# Patient Record
Sex: MALE | Race: WHITE | NOT HISPANIC OR LATINO | URBAN - METROPOLITAN AREA
[De-identification: names, ages, dates, MRNs, and addresses within clinical notes are randomized per-mention and may not be internally consistent; named-entity substitution may affect disease eponyms.]

---

## 2018-02-12 ENCOUNTER — EMERGENCY (EMERGENCY)
Facility: HOSPITAL | Age: 53
LOS: 1 days | Discharge: HOME | End: 2018-02-12
Attending: EMERGENCY MEDICINE
Payer: MEDICARE

## 2018-02-12 VITALS
HEIGHT: 73 IN | DIASTOLIC BLOOD PRESSURE: 90 MMHG | WEIGHT: 240.08 LBS | RESPIRATION RATE: 17 BRPM | HEART RATE: 57 BPM | SYSTOLIC BLOOD PRESSURE: 122 MMHG | TEMPERATURE: 98 F | OXYGEN SATURATION: 96 %

## 2018-02-12 DIAGNOSIS — M54.12 RADICULOPATHY, CERVICAL REGION: Chronic | ICD-10-CM

## 2018-02-12 LAB
ACETONE SERPL-MCNC: NEGATIVE — SIGNIFICANT CHANGE UP
ALBUMIN SERPL ELPH-MCNC: 3.4 G/DL — LOW (ref 3.5–5)
ALP SERPL-CCNC: 104 U/L — SIGNIFICANT CHANGE UP (ref 40–120)
ALT FLD-CCNC: 24 U/L DA — SIGNIFICANT CHANGE UP (ref 10–60)
ANION GAP SERPL CALC-SCNC: 5 MMOL/L — SIGNIFICANT CHANGE UP (ref 5–17)
AST SERPL-CCNC: 18 U/L — SIGNIFICANT CHANGE UP (ref 10–40)
BASOPHILS # BLD AUTO: 0.1 K/UL — SIGNIFICANT CHANGE UP (ref 0–0.2)
BASOPHILS NFR BLD AUTO: 1.4 % — SIGNIFICANT CHANGE UP (ref 0–2)
BILIRUB SERPL-MCNC: 0.2 MG/DL — SIGNIFICANT CHANGE UP (ref 0.2–1.2)
BUN SERPL-MCNC: 9 MG/DL — SIGNIFICANT CHANGE UP (ref 7–18)
CALCIUM SERPL-MCNC: 8.2 MG/DL — LOW (ref 8.4–10.5)
CHLORIDE SERPL-SCNC: 109 MMOL/L — HIGH (ref 96–108)
CK MB BLD-MCNC: <0.8 % — SIGNIFICANT CHANGE UP (ref 0–3.5)
CK MB CFR SERPL CALC: <1 NG/ML — SIGNIFICANT CHANGE UP (ref 0–3.6)
CK SERPL-CCNC: 126 U/L — SIGNIFICANT CHANGE UP (ref 35–232)
CO2 SERPL-SCNC: 28 MMOL/L — SIGNIFICANT CHANGE UP (ref 22–31)
CREAT SERPL-MCNC: 1.07 MG/DL — SIGNIFICANT CHANGE UP (ref 0.5–1.3)
EOSINOPHIL # BLD AUTO: 0.3 K/UL — SIGNIFICANT CHANGE UP (ref 0–0.5)
EOSINOPHIL NFR BLD AUTO: 3.8 % — SIGNIFICANT CHANGE UP (ref 0–6)
GLUCOSE SERPL-MCNC: 90 MG/DL — SIGNIFICANT CHANGE UP (ref 70–99)
HCT VFR BLD CALC: 45.6 % — SIGNIFICANT CHANGE UP (ref 39–50)
HGB BLD-MCNC: 14.8 G/DL — SIGNIFICANT CHANGE UP (ref 13–17)
LYMPHOCYTES # BLD AUTO: 3.4 K/UL — HIGH (ref 1–3.3)
LYMPHOCYTES # BLD AUTO: 41.1 % — SIGNIFICANT CHANGE UP (ref 13–44)
MAGNESIUM SERPL-MCNC: 2.2 MG/DL — SIGNIFICANT CHANGE UP (ref 1.6–2.6)
MCHC RBC-ENTMCNC: 32.2 PG — SIGNIFICANT CHANGE UP (ref 27–34)
MCHC RBC-ENTMCNC: 32.4 GM/DL — SIGNIFICANT CHANGE UP (ref 32–36)
MCV RBC AUTO: 99.5 FL — SIGNIFICANT CHANGE UP (ref 80–100)
MONOCYTES # BLD AUTO: 0.5 K/UL — SIGNIFICANT CHANGE UP (ref 0–0.9)
MONOCYTES NFR BLD AUTO: 6.2 % — SIGNIFICANT CHANGE UP (ref 2–14)
NEUTROPHILS # BLD AUTO: 3.9 K/UL — SIGNIFICANT CHANGE UP (ref 1.8–7.4)
NEUTROPHILS NFR BLD AUTO: 47.5 % — SIGNIFICANT CHANGE UP (ref 43–77)
NT-PROBNP SERPL-SCNC: 75 PG/ML — SIGNIFICANT CHANGE UP (ref 0–125)
PLATELET # BLD AUTO: 265 K/UL — SIGNIFICANT CHANGE UP (ref 150–400)
POTASSIUM SERPL-MCNC: 4.4 MMOL/L — SIGNIFICANT CHANGE UP (ref 3.5–5.3)
POTASSIUM SERPL-SCNC: 4.4 MMOL/L — SIGNIFICANT CHANGE UP (ref 3.5–5.3)
PROT SERPL-MCNC: 6.5 G/DL — SIGNIFICANT CHANGE UP (ref 6–8.3)
RBC # BLD: 4.58 M/UL — SIGNIFICANT CHANGE UP (ref 4.2–5.8)
RBC # FLD: 12.9 % — SIGNIFICANT CHANGE UP (ref 10.3–14.5)
SODIUM SERPL-SCNC: 142 MMOL/L — SIGNIFICANT CHANGE UP (ref 135–145)
TROPONIN I SERPL-MCNC: <0.015 NG/ML — SIGNIFICANT CHANGE UP (ref 0–0.04)
WBC # BLD: 8.2 K/UL — SIGNIFICANT CHANGE UP (ref 3.8–10.5)
WBC # FLD AUTO: 8.2 K/UL — SIGNIFICANT CHANGE UP (ref 3.8–10.5)

## 2018-02-12 PROCEDURE — 82550 ASSAY OF CK (CPK): CPT

## 2018-02-12 PROCEDURE — 80053 COMPREHEN METABOLIC PANEL: CPT

## 2018-02-12 PROCEDURE — 82553 CREATINE MB FRACTION: CPT

## 2018-02-12 PROCEDURE — 96374 THER/PROPH/DIAG INJ IV PUSH: CPT

## 2018-02-12 PROCEDURE — 85027 COMPLETE CBC AUTOMATED: CPT

## 2018-02-12 PROCEDURE — 83880 ASSAY OF NATRIURETIC PEPTIDE: CPT

## 2018-02-12 PROCEDURE — 70450 CT HEAD/BRAIN W/O DYE: CPT

## 2018-02-12 PROCEDURE — 99284 EMERGENCY DEPT VISIT MOD MDM: CPT

## 2018-02-12 PROCEDURE — 99284 EMERGENCY DEPT VISIT MOD MDM: CPT | Mod: 25

## 2018-02-12 PROCEDURE — 83735 ASSAY OF MAGNESIUM: CPT

## 2018-02-12 PROCEDURE — 82962 GLUCOSE BLOOD TEST: CPT

## 2018-02-12 PROCEDURE — 93005 ELECTROCARDIOGRAM TRACING: CPT

## 2018-02-12 PROCEDURE — 84484 ASSAY OF TROPONIN QUANT: CPT

## 2018-02-12 PROCEDURE — 70450 CT HEAD/BRAIN W/O DYE: CPT | Mod: 26

## 2018-02-12 PROCEDURE — 82009 KETONE BODYS QUAL: CPT

## 2018-02-12 RX ORDER — SODIUM CHLORIDE 9 MG/ML
1000 INJECTION INTRAMUSCULAR; INTRAVENOUS; SUBCUTANEOUS
Qty: 0 | Refills: 0 | Status: DISCONTINUED | OUTPATIENT
Start: 2018-02-12 | End: 2018-02-16

## 2018-02-12 RX ORDER — ONDANSETRON 8 MG/1
1 TABLET, FILM COATED ORAL
Qty: 15 | Refills: 0 | OUTPATIENT
Start: 2018-02-12 | End: 2018-02-16

## 2018-02-12 RX ORDER — MECLIZINE HCL 12.5 MG
12.5 TABLET ORAL ONCE
Qty: 0 | Refills: 0 | Status: COMPLETED | OUTPATIENT
Start: 2018-02-12 | End: 2018-02-12

## 2018-02-12 RX ORDER — MECLIZINE HCL 12.5 MG
1 TABLET ORAL
Qty: 21 | Refills: 0 | OUTPATIENT
Start: 2018-02-12 | End: 2018-02-18

## 2018-02-12 RX ORDER — METOCLOPRAMIDE HCL 10 MG
10 TABLET ORAL ONCE
Qty: 0 | Refills: 0 | Status: COMPLETED | OUTPATIENT
Start: 2018-02-12 | End: 2018-02-12

## 2018-02-12 RX ADMIN — Medication 12.5 MILLIGRAM(S): at 13:53

## 2018-02-12 RX ADMIN — SODIUM CHLORIDE 125 MILLILITER(S): 9 INJECTION INTRAMUSCULAR; INTRAVENOUS; SUBCUTANEOUS at 13:53

## 2018-02-12 RX ADMIN — Medication 104 MILLIGRAM(S): at 13:53

## 2018-02-12 NOTE — ED PROVIDER NOTE - EYES, MLM
Clear bilaterally, pupils equal, round and reactive to light. extraoccular movement intact. No ataxia.

## 2018-02-12 NOTE — ED PROVIDER NOTE - OBJECTIVE STATEMENT
53 y/o M w/ hx of HIV ( last CD4 count was  400, viral load undetectable 6 months ago) presents c/o dizziness, vertigo, ataxia w/ head movement, nausea x today. No recent eye infection, emesis, or any other complaints. NKDA.

## 2018-02-12 NOTE — ED PROVIDER NOTE - CROS ED NEURO ALL NEG
Notified Tania with lab that patient needs troponin drawn.      Wendi Palacio RN  12/12/17 [id number]     - - -

## 2018-02-12 NOTE — ED PROVIDER NOTE - SKIN, MLM
Skin normal color for race, warm, dry and intact. No evidence of rash. Multiple superficial scars present on bilateral forearms.

## 2018-11-17 ENCOUNTER — EMERGENCY (EMERGENCY)
Facility: HOSPITAL | Age: 53
LOS: 1 days | Discharge: HOME | End: 2018-11-17
Attending: EMERGENCY MEDICINE
Payer: MEDICARE

## 2018-11-17 VITALS
SYSTOLIC BLOOD PRESSURE: 124 MMHG | DIASTOLIC BLOOD PRESSURE: 81 MMHG | HEART RATE: 66 BPM | OXYGEN SATURATION: 97 % | TEMPERATURE: 98 F | RESPIRATION RATE: 18 BRPM

## 2018-11-17 VITALS — WEIGHT: 240.08 LBS | HEIGHT: 73 IN

## 2018-11-17 DIAGNOSIS — M54.12 RADICULOPATHY, CERVICAL REGION: Chronic | ICD-10-CM

## 2018-11-17 PROCEDURE — 99284 EMERGENCY DEPT VISIT MOD MDM: CPT

## 2018-11-17 PROCEDURE — 12001 RPR S/N/AX/GEN/TRNK 2.5CM/<: CPT | Mod: FA

## 2018-11-17 PROCEDURE — 99283 EMERGENCY DEPT VISIT LOW MDM: CPT | Mod: 25

## 2018-11-17 PROCEDURE — 12001 RPR S/N/AX/GEN/TRNK 2.5CM/<: CPT

## 2018-11-17 RX ORDER — IBUPROFEN 200 MG
800 TABLET ORAL ONCE
Qty: 0 | Refills: 0 | Status: COMPLETED | OUTPATIENT
Start: 2018-11-17 | End: 2018-11-17

## 2018-11-17 RX ORDER — OXYCODONE AND ACETAMINOPHEN 5; 325 MG/1; MG/1
1 TABLET ORAL ONCE
Qty: 0 | Refills: 0 | Status: DISCONTINUED | OUTPATIENT
Start: 2018-11-17 | End: 2018-11-17

## 2018-11-17 RX ORDER — IBUPROFEN 200 MG
1 TABLET ORAL
Qty: 20 | Refills: 0 | OUTPATIENT
Start: 2018-11-17 | End: 2018-11-21

## 2018-11-17 RX ADMIN — Medication 800 MILLIGRAM(S): at 20:36

## 2018-11-17 RX ADMIN — OXYCODONE AND ACETAMINOPHEN 1 TABLET(S): 5; 325 TABLET ORAL at 22:30

## 2018-11-17 RX ADMIN — Medication 800 MILLIGRAM(S): at 21:06

## 2018-11-17 RX ADMIN — OXYCODONE AND ACETAMINOPHEN 1 TABLET(S): 5; 325 TABLET ORAL at 21:59

## 2018-11-17 RX ADMIN — Medication 1 TABLET(S): at 21:59

## 2018-11-17 NOTE — ED ADULT NURSE NOTE - NSIMPLEMENTINTERV_GEN_ALL_ED
Implemented All Universal Safety Interventions:  [place] to call system. Call bell, personal items and telephone within reach. Instruct patient to call for assistance. Room bathroom lighting operational. Non-slip footwear when patient is off stretcher. Physically safe environment: no spills, clutter or unnecessary equipment. Stretcher in lowest position, wheels locked, appropriate side rails in place.

## 2018-11-17 NOTE — ED PROCEDURE NOTE - CPROC ED POST PROC CARE GUIDE1
Size Of Lesion In Cm (Optional): 0 Instructed patient/caregiver to follow-up with primary care physician./Verbal/written post procedure instructions were given to patient/caregiver./Instructed patient/caregiver regarding signs and symptoms of infection./Keep the cast/splint/dressing clean and dry. Detail Level: Zone

## 2018-11-17 NOTE — ED PROVIDER NOTE - MEDICAL DECISION MAKING DETAILS
Neurovasc intact. Tetanus immunization up to date. Plan: lac repair, will dc. Neurovasc intact. Tetanus immunization up to date. Plan: lac repair, sorin Villa MD

## 2018-11-17 NOTE — ED PROVIDER NOTE - OBJECTIVE STATEMENT
53 year-old male, history of HIV (last CD4 600s, VL undetect.), presents with cc L thumb laceration today. While cutting cilantro with knife sustained lac through nail. Denies numbness, tingling, or any other complaints. Last tetanus immunization up to date. 53 year-old male, history of HIV (last CD4 600s, VL undetect.), presents with cc L thumb laceration today. While cutting cilantro with knife sustained lac through nail. Denies numbness, tingling, or any other complaints. Last tetanus immunization up to date. -ANUSHKA Villa MD

## 2018-11-17 NOTE — ED PROVIDER NOTE - PHYSICAL EXAMINATION
L thumb 1.5 cm laceration through nail/nailbed. Mild venous bleeding. Flexion/extension intact against resistance. No sensory deficits. L thumb 1.5 cm laceration through nail/nailbed. Mild venous bleeding. Flexion/extension intact against resistance. No sensory deficits. -ANUSHKA Villa MD

## 2018-11-17 NOTE — ED STATDOCS - OBJECTIVE STATEMENT
Telemedicine assessment was conducted (using real time 2 way audio-video technology) by Dr. Casper located at [address] [place] [id number]  +++++++++++++++++++++++++++++++++++++++++++++++++++  History and Plan: 52 y/o M pt with PMHx of HIV presents to ED c/o laceration to L thumb x today. Pt states he was prepping dinner when he cut his L thumb through the nailbed. Pt is able to move his thumb. Last Tdap ~ 3 years ago. ALLERGIES: Sulfa Telemedicine assessment was conducted (using real time 2 way audio-video technology) by Dr. Casper located at [address] [place] [id number]  +++++++++++++++++++++++++++++++++++++++++++++++++++  History and Plan: 54 y/o M pt with PMHx of HIV presents to ED c/o laceration to L thumb x today. Pt states he was prepping dinner when he cut his L thumb through the nailbed. Pt is able to move his thumb. Last Tdap ~ 3 years ago. ALLERGIES: Sulfa  patient reports undetectable viral load    wound care/repair    Patient seen by me in intake for initial assessment and ordering. Physician on site to follow results and further evaluate and treat patient.

## 2019-09-06 ENCOUNTER — EMERGENCY (EMERGENCY)
Facility: HOSPITAL | Age: 54
LOS: 1 days | Discharge: HOME | End: 2019-09-06
Attending: EMERGENCY MEDICINE
Payer: MEDICARE

## 2019-09-06 VITALS
OXYGEN SATURATION: 97 % | HEIGHT: 73 IN | HEART RATE: 67 BPM | SYSTOLIC BLOOD PRESSURE: 130 MMHG | DIASTOLIC BLOOD PRESSURE: 90 MMHG | RESPIRATION RATE: 16 BRPM | WEIGHT: 250 LBS | TEMPERATURE: 98 F

## 2019-09-06 DIAGNOSIS — Z98.890 OTHER SPECIFIED POSTPROCEDURAL STATES: Chronic | ICD-10-CM

## 2019-09-06 DIAGNOSIS — M54.12 RADICULOPATHY, CERVICAL REGION: Chronic | ICD-10-CM

## 2019-09-06 PROCEDURE — 99284 EMERGENCY DEPT VISIT MOD MDM: CPT | Mod: 25

## 2019-09-06 PROCEDURE — 99284 EMERGENCY DEPT VISIT MOD MDM: CPT

## 2019-09-06 PROCEDURE — 96374 THER/PROPH/DIAG INJ IV PUSH: CPT

## 2019-09-06 RX ORDER — KETOROLAC TROMETHAMINE 30 MG/ML
15 SYRINGE (ML) INJECTION ONCE
Refills: 0 | Status: DISCONTINUED | OUTPATIENT
Start: 2019-09-06 | End: 2019-09-06

## 2019-09-06 RX ORDER — DIAZEPAM 5 MG
5 TABLET ORAL ONCE
Refills: 0 | Status: DISCONTINUED | OUTPATIENT
Start: 2019-09-06 | End: 2019-09-06

## 2019-09-06 RX ORDER — LIDOCAINE 4 G/100G
1 CREAM TOPICAL ONCE
Refills: 0 | Status: COMPLETED | OUTPATIENT
Start: 2019-09-06 | End: 2019-09-06

## 2019-09-06 RX ORDER — FAMOTIDINE 10 MG/ML
1 INJECTION INTRAVENOUS
Qty: 30 | Refills: 0
Start: 2019-09-06

## 2019-09-06 RX ORDER — LIDOCAINE 4 G/100G
1 CREAM TOPICAL
Qty: 15 | Refills: 0
Start: 2019-09-06 | End: 2019-09-20

## 2019-09-06 RX ORDER — CYCLOBENZAPRINE HYDROCHLORIDE 10 MG/1
1 TABLET, FILM COATED ORAL
Qty: 30 | Refills: 0
Start: 2019-09-06 | End: 2019-09-15

## 2019-09-06 RX ADMIN — Medication 5 MILLIGRAM(S): at 14:02

## 2019-09-06 RX ADMIN — Medication 15 MILLIGRAM(S): at 14:02

## 2019-09-06 RX ADMIN — LIDOCAINE 1 APPLICATION(S): 4 CREAM TOPICAL at 14:17

## 2019-09-06 RX ADMIN — LIDOCAINE 1 PATCH: 4 CREAM TOPICAL at 14:04

## 2019-09-06 NOTE — ED PROVIDER NOTE - CPE EDP SKIN NORM
Post-Care Instructions: I reviewed with the patient in detail post-care instructions. Patient is to wear sunprotection, and avoid picking at any of the treated lesions. Pt may apply Vaseline to crusted or scabbing areas. Consent: The patient's consent was obtained including but not limited to risks of crusting, scabbing, blistering, scarring, darker or lighter pigmentary change, recurrence, incomplete removal and infection. Number Of Freeze-Thaw Cycles: 1 freeze-thaw cycle Duration Of Freeze Thaw-Cycle (Seconds): 0 Render Post-Care Instructions In Note?: no Detail Level: Detailed normal...

## 2019-09-06 NOTE — ED ADULT TRIAGE NOTE - CHIEF COMPLAINT QUOTE
" I have a lot of pain today ", pt is here c/o pain to the neck, lower back and rectum . " I have a lot of pain today ", pt is here c/o pain to the neck, lower back and rectum . Pt denies any recent fall or injury , verbalizes having a colposcopy 08/19/19

## 2019-09-06 NOTE — ED ADULT NURSE NOTE - CHIEF COMPLAINT QUOTE
" I have a lot of pain today ", pt is here c/o pain to the neck, lower back and rectum . Pt denies any recent fall or injury , verbalizes having a colposcopy 08/19/19

## 2019-09-06 NOTE — ED PROVIDER NOTE - OBJECTIVE STATEMENT
53 y/o M with a significant PMHx of HIV, colorectal dysplasia, anal fissure and a significant PSHx of laminectomy presents to the ED with complaints of rectal pain and neck pain. Patient states rectal pain started 5 days ago and has been progressively worsening. Patient had colonoscopy on 8/19/19 and believes pain is related to that procedure because 5 samples were taken. Patient reports difficulty sitting secondary to pain. Denies blood in stool, fever, chills, pain with sex or dysuria. Patient also complaining of neck pain which he states he rarely gets since the laminectomy. Denies numbness, weakness, paresthesias or any other acute complaints.

## 2019-09-06 NOTE — ED PROVIDER NOTE - PATIENT PORTAL LINK FT
You can access the FollowMyHealth Patient Portal offered by [facility] by registering at the following website: [email or web address]. By joining [employer]’s FollowMyHealth portal, you will also be able to view your health information using other applications (apps) compatible with our system.

## 2019-09-06 NOTE — ED PROVIDER NOTE - CLINICAL SUMMARY MEDICAL DECISION MAKING FREE TEXT BOX
53 y/o M presents with neck pain and back pain. Will treat neck pain with analgesia and follow up with ortho; no signs of motor deficits. For rectum, likely anal fissure. Will treat with lidocaine and follow up with GI.

## 2019-10-26 ENCOUNTER — EMERGENCY (EMERGENCY)
Facility: HOSPITAL | Age: 54
LOS: 1 days | Discharge: HOME | End: 2019-10-26
Attending: EMERGENCY MEDICINE
Payer: MEDICARE

## 2019-10-26 VITALS
TEMPERATURE: 98 F | HEIGHT: 73 IN | RESPIRATION RATE: 17 BRPM | WEIGHT: 244.93 LBS | HEART RATE: 85 BPM | SYSTOLIC BLOOD PRESSURE: 147 MMHG | OXYGEN SATURATION: 98 % | DIASTOLIC BLOOD PRESSURE: 87 MMHG

## 2019-10-26 DIAGNOSIS — Z98.890 OTHER SPECIFIED POSTPROCEDURAL STATES: Chronic | ICD-10-CM

## 2019-10-26 DIAGNOSIS — M54.12 RADICULOPATHY, CERVICAL REGION: Chronic | ICD-10-CM

## 2019-10-26 PROCEDURE — 99283 EMERGENCY DEPT VISIT LOW MDM: CPT

## 2019-10-26 PROCEDURE — 96374 THER/PROPH/DIAG INJ IV PUSH: CPT

## 2019-10-26 PROCEDURE — 99284 EMERGENCY DEPT VISIT MOD MDM: CPT | Mod: 25

## 2019-10-26 RX ORDER — KETOROLAC TROMETHAMINE 30 MG/ML
30 SYRINGE (ML) INJECTION ONCE
Refills: 0 | Status: DISCONTINUED | OUTPATIENT
Start: 2019-10-26 | End: 2019-10-26

## 2019-10-26 RX ADMIN — Medication 30 MILLIGRAM(S): at 18:58

## 2019-10-26 NOTE — ED PROVIDER NOTE - CLINICAL SUMMARY MEDICAL DECISION MAKING FREE TEXT BOX
53 y/o M pt presents to ED with general muscle spasms. Will give NSAID for perceived muscle cramps and pain, refer back to pain management to adjust Pregabalin dosing, reassurance and discussed eating foods rich in potassium.

## 2019-10-26 NOTE — ED PROVIDER NOTE - OBJECTIVE STATEMENT
53 y/o M pt with PMHx of anal fissure and HIV, and PSHx of cervical radiculopathy and laminectomy presents to ED c/o generalized muscle twitching today. Pt states that his Pregabalin dose "increased from 25mg to 75mg" and is concerned he is going to have muscle in his heart cramp and give him a heart attack. Pt also expressed concern that Pregabalin is "interacting with Citalopram, and the doctor should know it." Pt also endorses that he is feeling too happy and has concerns he might have serotonin syndrome. Pt denies chest pain, dizziness, shortness of breath, and any other acute complaints.

## 2019-10-26 NOTE — ED PROVIDER NOTE - PROGRESS NOTE DETAILS
Ran patient through [facility], recent Rx for Percocet on 10/14. Discussed with patient half life of pre-gablin (6.5 hrs), hydration, and electrolytes via fruits and vegetables, as well as speaking with pain management for a new medication for pain control, non-opioid based. Pt is well appearing walking with steady gait, stable for discharge and follow up without fail with medical doctor. I had a detailed discussion with the patient and/or guardian regarding the historical points, exam findings, and any diagnostic results supporting the discharge diagnosis. Pt educated on care and need for follow up. Strict return instructions and red flag signs and symptoms discussed with patient. Questions answered. Pt shows understanding of discharge information and agrees to follow.

## 2019-10-26 NOTE — ED PROVIDER NOTE - ATTENDING CONTRIBUTION TO CARE
I completed an independent physical examination.   I have signed out the follow up of any pending tests (i.e. labs, radiological studies) to the PA/NP.  I have discussed the patient’s plan of care and disposition with the PA/NP    Patient with muscle twitching. Will provide analgesia and instructions to follow up with pain management specialist.

## 2019-10-26 NOTE — ED PROVIDER NOTE - PATIENT PORTAL LINK FT
You can access the FollowMyHealth Patient Portal offered by [facility] by registering at the following website: [email or web address]. By joining [employer]’s FollowMyHealth portal, you will also be able to view your health information using other applications (apps) compatible with our system.

## 2019-10-26 NOTE — ED PROVIDER NOTE - PHYSICAL EXAMINATION
MSK: Not ataxic, muscle movements are fluid, no decreased muscle strength, moving all extremities uniformly, good eye contact, speaking in full sentences.

## 2019-10-26 NOTE — ED ADULT NURSE NOTE - OBJECTIVE STATEMENT
States he has generalized body cramping and muscle twitching , worse to right lower back and right calf x3 days. States his doctor increased his medication pregabalin from 25mg to 75mg .

## 2019-10-27 PROBLEM — K60.2 ANAL FISSURE, UNSPECIFIED: Chronic | Status: ACTIVE | Noted: 2019-09-06

## 2020-05-24 NOTE — ED ADULT TRIAGE NOTE - SPO2 (%)
96 75 y/o M returns to ED after being called back for a foot fracture on the left foot.  xray concerning for lisfranc or charcot foot--pt has neuropathy from cancer treatment  no acute trauma except puncture wound  plan splint, crutches, podiatry fu

## 2020-08-10 NOTE — ED ADULT NURSE NOTE - NSSUHOSCREENINGYN_ED_ALL_ED
Addended by: LB WHEAT on: 8/10/2020 01:53 PM     Modules accepted: Orders     Yes - the patient is able to be screened

## 2021-02-19 NOTE — ED PROVIDER NOTE - SKIN [+], MLM
Called patient - LMOM to call the office back to let us know that he pulled out his stent  Office number is provided  Patient is also placed in call backs for 6 months  He needs a ultraosund PTV  LACERATION

## 2021-11-10 NOTE — ED PROVIDER NOTE - PROGRESS NOTE DETAILS
Part of the nail removed to expose the nailbed. Nailbed repaired and lac extending to finger repaired. Surgicel applied. Will need to follow up with the hand surgeon in 1 week. Given history of HIV, will treat prophylactically with Augmentin. Pt is well appearing walking with steady gait, stable for discharge and follow up without fail with medical doctor. I had a detailed discussion with the patient and/or guardian regarding the historical points, exam findings, and any diagnostic results supporting the discharge diagnosis. Pt educated on care and need for follow up. Strict return instructions and red flag signs and symptoms discussed with patient. Questions answered. Pt shows understanding of discharge information and agrees to follow.
NG tube/other

## 2024-04-12 NOTE — ED PROVIDER NOTE - ENMT, MLM
Airway patent, Nasal mucosa clear. Mouth with normal mucosa. Throat has no vesicles, no oropharyngeal exudates and uvula is midline.
n/a